# Patient Record
(demographics unavailable — no encounter records)

---

## 2025-03-17 NOTE — VITALS
[Maximal Pain Intensity: 1/10] : 1/10 [Least Pain Intensity: 0/10] : 0/10 [Pain Description/Quality: ___] : Pain description/quality: [unfilled] [NoTreatment Scheduled] : no treatment scheduled [90: Able to carry normal activity; minor signs or symptoms of disease.] : 90: Able to carry normal activity; minor signs or symptoms of disease.  [1 - Distress Level] : Distress Level: 1

## 2025-03-17 NOTE — REVIEW OF SYSTEMS
[Negative] : Psychiatric [Edema Limbs: Grade 0] : Edema Limbs: Grade 0  [Fatigue: Grade 0] : Fatigue: Grade 0 [Localized Edema: Grade 0] : Localized Edema: Grade 0  [Neck Edema: Grade 0] : Neck Edema: Grade 0 [Cognitive Disturbance: Grade 0] : Cognitive Disturbance: Grade 0 [Concentration Impairment: Grade 0] : Concentration Impairment: Grade 0 [Dizziness: Grade 0] : Dizziness: Grade 0  [Facial Muscle Weakness: Grade 0] : Facial Muscle Weakness: Grade 0 [Headache: Grade 0] : Headache: Grade 0 [Anxiety: Grade 0] : Anxiety: Grade 0  [Depression: Grade 0] : Depression: Grade 0 [Cough: Grade 0] : Cough: Grade 0 [Dyspnea: Grade 0] : Dyspnea: Grade 0 [Hiccups: Grade 0] : Hiccups: Grade 0 [Hoarseness: Grade 0] : Hoarseness: Grade 0 [Hypoxia: Grade 0] : Hypoxia: Grade 0 [Pneumonitis: Grade 0] : Pneumonitis: Grade 0

## 2025-03-18 NOTE — HISTORY OF PRESENT ILLNESS
[FreeTextEntry1] : 71 years old female, , felt painless mass in the right breast. She had skipped mammogram in . 25: bilateral mammogram and ultrasound revealed new focal asymmetry with pleomorphic calcifications spanning up to 3.5 cm. in the right breast at 9;30 Left breast: scar tissue at 1:00. Ultrasound: right breast- 10:00, 8 cm from nipple, 2.6 x2.5 x 1.6 cm irregular hypoechoic irregular mass with calcifications.   2025; US guided core biopsy right breast: ductal carcinoma in situ, high nuclear grade, comedo pattern, Estrogen receptors low positivity ( 1-10%) Progesterone receptors positive, 30% of tumor cells with nuclear positivity.   Bilateral breast MRI: 2.0x4.5x2.9 cm heterogeneous non mass enhancement in the mid depth right upper outer quadrant. No other abnormal findings.   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 3 lymph nodes negative for metastasis. Good post operative recovery.

## 2025-03-18 NOTE — PHYSICAL EXAM
[FreeTextEntry1] : Pleasant. Alert, oriented. No axillary or neck adenopathy. Lungs clear. Abdomen soft. Left breast unremarkable. Right breast: hematoma present in the upper outer quadrant. Incisions intact.

## 2025-03-18 NOTE — LETTER GREETING
[Consult Letter:] : Your patient, [unfilled] was seen in my office today for consultation. [FreeTextEntry2] : Breezy Tapia M.D

## 2025-03-18 NOTE — OB/GYN HISTORY
[___] : Full Term: [unfilled] [History of Birth Control Pills] : Patient has no history of taking birth control pills [History of Hormone Replacement Therapy] : no history of hormone replacement therapy [Currently In Menopause] : currently in menopause [Experiencing Menopausal Sxs] : not experiencing menopausal symptoms [Menopause Age: ____] : patient was [unfilled] years old at menopause

## 2025-05-22 NOTE — REVIEW OF SYSTEMS
[Negative] : Psychiatric [Dysphagia: Grade 0] : Dysphagia: Grade 0 [Edema Limbs: Grade 0] : Edema Limbs: Grade 0  [Fatigue: Grade 0] : Fatigue: Grade 0 [Localized Edema: Grade 0] : Localized Edema: Grade 0  [Neck Edema: Grade 0] : Neck Edema: Grade 0 [Cognitive Disturbance: Grade 0] : Cognitive Disturbance: Grade 0 [Concentration Impairment: Grade 0] : Concentration Impairment: Grade 0 [Dizziness: Grade 0] : Dizziness: Grade 0  [Facial Muscle Weakness: Grade 0] : Facial Muscle Weakness: Grade 0 [Headache: Grade 0] : Headache: Grade 0 [Anxiety: Grade 0] : Anxiety: Grade 0  [Depression: Grade 0] : Depression: Grade 0 [Cough: Grade 0] : Cough: Grade 0 [Dyspnea: Grade 0] : Dyspnea: Grade 0 [Hiccups: Grade 0] : Hiccups: Grade 0 [Hoarseness: Grade 0] : Hoarseness: Grade 0 [Hypoxia: Grade 0] : Hypoxia: Grade 0 [Pneumonitis: Grade 0] : Pneumonitis: Grade 0 [Alopecia: Grade 0] : Alopecia: Grade 0 [Pruritus: Grade 0] : Pruritus: Grade 0 [Skin Atrophy: Grade 0] : Skin Atrophy: Grade 0 [Skin Hyperpigmentation: Grade 0] : Skin Hyperpigmentation: Grade 0 [Skin Induration: Grade 0] : Skin Induration: Grade 0 [Dermatitis Radiation: Grade 0] : Dermatitis Radiation: Grade 0

## 2025-05-22 NOTE — VITALS
[Maximal Pain Intensity: 1/10] : 1/10 [Least Pain Intensity: 0/10] : 0/10 [Pain Description/Quality: ___] : Pain description/quality: [unfilled] [NoTreatment Scheduled] : no treatment scheduled [90: Able to carry normal activity; minor signs or symptoms of disease.] : 90: Able to carry normal activity; minor signs or symptoms of disease.  [ECOG Performance Status: 1 - Restricted in physically strenuous activity but ambulatory and able to carry out work of a light or sedentary nature] : Performance Status: 1 - Restricted in physically strenuous activity but ambulatory and able to carry out work of a light or sedentary nature, e.g., light house work, office work [3 - Distress Level] : Distress Level: 3

## 2025-05-26 NOTE — HISTORY OF PRESENT ILLNESS
[FreeTextEntry1] : 71 years old female, , felt painless mass in the right breast. She had skipped mammogram in . 25: bilateral mammogram and ultrasound revealed new focal asymmetry with pleomorphic calcifications spanning up to 3.5 cm. in the right breast at 9;30 Left breast: scar tissue at 1:00. Ultrasound: right breast- 10:00, 8 cm from nipple, 2.6 x2.5 x 1.6 cm irregular hypoechoic irregular mass with calcifications.   2025; US guided core biopsy right breast: ductal carcinoma in situ, high nuclear grade, comedo pattern, Estrogen receptors low positivity ( 1-10%) Progesterone receptors positive, 30% of tumor cells with nuclear positivity.   Bilateral breast MRI: 2.0x4.5x2.9 cm heterogeneous non mass enhancement in the mid depth right upper outer quadrant. No other abnormal findings.   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 3 lymph nodes negative for metastasis. Good post operative recovery.   2025 OTV.  Fx of RT to right breast were completed. Skin cream was recommended to use over RT area.

## 2025-05-26 NOTE — OB/GYN HISTORY
[Currently In Menopause] : currently in menopause [Menopause Age: ____] : patient was [unfilled] years old at menopause [___] : Full Term: [unfilled] [History of Birth Control Pills] : Patient has no history of taking birth control pills [History of Hormone Replacement Therapy] : no history of hormone replacement therapy [Experiencing Menopausal Sxs] : not experiencing menopausal symptoms

## 2025-05-26 NOTE — DISEASE MANAGEMENT
[Pathological] : TNM Stage: p [0] : 0 [TTNM] : is [NTNM] : 0 [MTNM] : 0 [de-identified] : 5240 cGy [de-identified] : right breast

## 2025-05-26 NOTE — DISEASE MANAGEMENT
[Pathological] : TNM Stage: p [0] : 0 [TTNM] : is [NTNM] : 0 [MTNM] : 0 [de-identified] : 5240 cGy [de-identified] : right breast

## 2025-06-09 NOTE — HISTORY OF PRESENT ILLNESS
[FreeTextEntry1] : 71 years old female, , felt painless mass in the right breast. She had skipped mammogram in . 25: bilateral mammogram and ultrasound revealed new focal asymmetry with pleomorphic calcifications spanning up to 3.5 cm. in the right breast at 9;30 Left breast: scar tissue at 1:00. Ultrasound: right breast- 10:00, 8 cm from nipple, 2.6 x2.5 x 1.6 cm irregular hypoechoic irregular mass with calcifications.  2025; US guided core biopsy right breast: ductal carcinoma in situ, high nuclear grade, comedo pattern, Estrogen receptors low positivity ( 1-10%) Progesterone receptors positive, 30% of tumor cells with nuclear positivity.  Bilateral breast MRI: 2.0x4.5x2.9 cm heterogeneous non mass enhancement in the mid depth right upper outer quadrant. No other abnormal findings.  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 3 lymph nodes negative for metastasis. Good post operative recovery.  2025 OTV. 7 Fx of RT to right breast were completed. Skin cream was recommended to use over RT area.  25 OTV- tolerating RT well. Experiencing mild tenderness in the right breast. Erythema grade 1. Mild itching.

## 2025-06-09 NOTE — DISEASE MANAGEMENT
[Pathological] : TNM Stage: p [TTNM] : is [NTNM] : 0 [MTNM] : 0 [0] : 0 [de-identified] : 5665 [de-identified] : 8997 [de-identified] : right breast

## 2025-07-07 NOTE — HISTORY OF PRESENT ILLNESS
[FreeTextEntry1] : 72 years old female, , felt painless mass in the right breast. She had skipped mammogram in . 25: bilateral mammogram and ultrasound revealed new focal asymmetry with pleomorphic calcifications spanning up to 3.5 cm. in the right breast at 9;30 Left breast: scar tissue at 1:00. Ultrasound: right breast- 10:00, 8 cm from nipple, 2.6 x2.5 x 1.6 cm irregular hypoechoic irregular mass with calcifications.  2025; US guided core biopsy right breast: ductal carcinoma in situ, high nuclear grade, comedo pattern, Estrogen receptors low positivity ( 1-10%) Progesterone receptors positive, 30% of tumor cells with nuclear positivity.  Bilateral breast MRI: 2.0x4.5x2.9 cm heterogeneous non mass enhancement in the mid depth right upper outer quadrant. No other abnormal findings.  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 3 lymph nodes negative for metastasis. Good post operative recovery.  2025 OTV.  Fx of RT to right breast were completed. Skin cream was recommended to use over RT area.  25 OTV- tolerating RT well. Experiencing mild tenderness in the right breast. Erythema grade 1. Mild itching. Completed radiation therapy to right breast one month ago. Experienced hyperpigmentation and tenderness of the right breast, gradually better. On 1 mg anastrozole daily, developed hot flashes and sweats,

## 2025-07-07 NOTE — VITALS
[Maximal Pain Intensity: 1/10] : 1/10 [Least Pain Intensity: 0/10] : 0/10 [NoTreatment Scheduled] : no treatment scheduled [90: Able to carry normal activity; minor signs or symptoms of disease.] : 90: Able to carry normal activity; minor signs or symptoms of disease.

## 2025-07-07 NOTE — LETTER GREETING
[Follow-Up] : Your patient, [unfilled] was seen in my office today for follow-up [FreeTextEntry2] : Breezy Tapia M.D

## 2025-07-07 NOTE — REVIEW OF SYSTEMS
[Edema Limbs: Grade 0] : Edema Limbs: Grade 0  [Fatigue: Grade 0] : Fatigue: Grade 0 [Localized Edema: Grade 0] : Localized Edema: Grade 0  [Cognitive Disturbance: Grade 0] : Cognitive Disturbance: Grade 0 [Concentration Impairment: Grade 0] : Concentration Impairment: Grade 0 [Dizziness: Grade 0] : Dizziness: Grade 0  [Anxiety: Grade 0] : Anxiety: Grade 0  [Depression: Grade 0] : Depression: Grade 0 [Cough: Grade 0] : Cough: Grade 0 [Dyspnea: Grade 0] : Dyspnea: Grade 0 [Hiccups: Grade 0] : Hiccups: Grade 0